# Patient Record
Sex: FEMALE | Race: BLACK OR AFRICAN AMERICAN
[De-identification: names, ages, dates, MRNs, and addresses within clinical notes are randomized per-mention and may not be internally consistent; named-entity substitution may affect disease eponyms.]

---

## 2017-01-04 ENCOUNTER — APPOINTMENT (OUTPATIENT)
Dept: OBGYN | Facility: CLINIC | Age: 27
End: 2017-01-04

## 2017-01-04 VITALS — WEIGHT: 157 LBS | SYSTOLIC BLOOD PRESSURE: 110 MMHG | DIASTOLIC BLOOD PRESSURE: 70 MMHG

## 2017-01-04 LAB
BILIRUB UR QL STRIP: NORMAL
CLARITY UR: CLEAR
COLLECTION METHOD: NORMAL
GLUCOSE UR-MCNC: NORMAL
HCG UR QL: 0.2 EU/DL
HGB UR QL STRIP.AUTO: NORMAL
KETONES UR-MCNC: NORMAL
LEUKOCYTE ESTERASE UR QL STRIP: NORMAL
NITRITE UR QL STRIP: NORMAL
PH UR STRIP: 7
PROT UR STRIP-MCNC: NORMAL
SP GR UR STRIP: 1.01

## 2017-01-10 ENCOUNTER — APPOINTMENT (OUTPATIENT)
Dept: OBGYN | Facility: CLINIC | Age: 27
End: 2017-01-10

## 2017-01-10 VITALS — DIASTOLIC BLOOD PRESSURE: 80 MMHG | WEIGHT: 160 LBS | SYSTOLIC BLOOD PRESSURE: 104 MMHG

## 2017-01-10 DIAGNOSIS — Z3A.38 38 WEEKS GESTATION OF PREGNANCY: ICD-10-CM

## 2017-01-14 ENCOUNTER — INPATIENT (INPATIENT)
Facility: HOSPITAL | Age: 27
LOS: 1 days | Discharge: HOME | End: 2017-01-16
Attending: SPECIALIST | Admitting: SPECIALIST
Payer: COMMERCIAL

## 2017-01-14 PROCEDURE — 59400 OBSTETRICAL CARE: CPT

## 2017-01-15 PROCEDURE — 93010 ELECTROCARDIOGRAM REPORT: CPT

## 2017-01-16 PROCEDURE — 93306 TTE W/DOPPLER COMPLETE: CPT | Mod: 26

## 2017-01-17 ENCOUNTER — APPOINTMENT (OUTPATIENT)
Dept: OBGYN | Facility: CLINIC | Age: 27
End: 2017-01-17

## 2017-01-20 DIAGNOSIS — D72.829 ELEVATED WHITE BLOOD CELL COUNT, UNSPECIFIED: ICD-10-CM

## 2017-01-20 DIAGNOSIS — Z3A.39 39 WEEKS GESTATION OF PREGNANCY: ICD-10-CM

## 2017-01-20 DIAGNOSIS — R07.9 CHEST PAIN, UNSPECIFIED: ICD-10-CM

## 2017-01-20 DIAGNOSIS — R03.0 ELEVATED BLOOD-PRESSURE READING, WITHOUT DIAGNOSIS OF HYPERTENSION: ICD-10-CM

## 2017-01-23 ENCOUNTER — APPOINTMENT (OUTPATIENT)
Dept: OBGYN | Facility: CLINIC | Age: 27
End: 2017-01-23

## 2017-01-23 VITALS
SYSTOLIC BLOOD PRESSURE: 122 MMHG | WEIGHT: 144 LBS | DIASTOLIC BLOOD PRESSURE: 82 MMHG | HEIGHT: 69 IN | BODY MASS INDEX: 21.33 KG/M2

## 2017-01-30 DIAGNOSIS — O42.90 PREMATURE RUPTURE OF MEMBRANES, UNSPECIFIED AS TO LENGTH OF TIME BETWEEN RUPTURE AND ONSET OF LABOR, UNSPECIFIED WEEKS OF GESTATION: ICD-10-CM

## 2017-02-14 ENCOUNTER — APPOINTMENT (OUTPATIENT)
Dept: OBGYN | Facility: CLINIC | Age: 27
End: 2017-02-14

## 2017-02-14 VITALS — WEIGHT: 144 LBS | SYSTOLIC BLOOD PRESSURE: 108 MMHG | DIASTOLIC BLOOD PRESSURE: 64 MMHG

## 2017-02-14 DIAGNOSIS — Z30.9 ENCOUNTER FOR CONTRACEPTIVE MANAGEMENT, UNSPECIFIED: ICD-10-CM

## 2017-02-14 DIAGNOSIS — Z30.013 ENCOUNTER FOR INITIAL PRESCRIPTION OF INJECTABLE CONTRACEPTIVE: ICD-10-CM

## 2017-02-15 LAB
BILIRUB UR QL STRIP: NORMAL
CLARITY UR: CLEAR
COLLECTION METHOD: NORMAL
GLUCOSE UR-MCNC: NORMAL
HCG UR QL: 0.2 EU/DL
HGB UR QL STRIP.AUTO: NORMAL
KETONES UR-MCNC: NORMAL
LEUKOCYTE ESTERASE UR QL STRIP: NORMAL
NITRITE UR QL STRIP: NORMAL
PH UR STRIP: 6
PROT UR STRIP-MCNC: NORMAL
SP GR UR STRIP: 1.02

## 2017-03-01 LAB
C TRACH DNA SPEC QL NAA+PROBE: NORMAL
C TRACH RRNA SPEC QL NAA+PROBE: NORMAL
CYTOLOGY CVX/VAG DOC THIN PREP: NORMAL
N GONORRHOEA DNA SPEC QL NAA+PROBE: NORMAL
N GONORRHOEA RRNA SPEC QL NAA+PROBE: NORMAL
SOURCE TP AMPLIFICATION: NORMAL

## 2017-03-08 ENCOUNTER — MESSAGE (OUTPATIENT)
Age: 27
End: 2017-03-08

## 2017-04-13 RX ORDER — MEDROXYPROGESTERONE ACETATE 150 MG/ML
150 INJECTION, SUSPENSION INTRAMUSCULAR
Qty: 1 | Refills: 3 | Status: COMPLETED | COMMUNITY
Start: 2017-02-14 | End: 2017-02-14

## 2020-05-14 ENCOUNTER — OFFICE VISIT (OUTPATIENT)
Dept: ORTHOPEDICS | Facility: CLINIC | Age: 30
End: 2020-05-14
Payer: COMMERCIAL

## 2020-05-14 ENCOUNTER — ANCILLARY PROCEDURE (OUTPATIENT)
Dept: GENERAL RADIOLOGY | Facility: CLINIC | Age: 30
End: 2020-05-14
Attending: FAMILY MEDICINE
Payer: COMMERCIAL

## 2020-05-14 VITALS — HEIGHT: 69 IN | WEIGHT: 160 LBS | BODY MASS INDEX: 23.7 KG/M2

## 2020-05-14 DIAGNOSIS — M25.552 LEFT HIP PAIN: Primary | ICD-10-CM

## 2020-05-14 DIAGNOSIS — M25.552 PAIN OF LEFT HIP JOINT: ICD-10-CM

## 2020-05-14 ASSESSMENT — MIFFLIN-ST. JEOR: SCORE: 1525.14

## 2020-05-14 NOTE — PROGRESS NOTES
SPORTS & ORTHOPEDIC WALK-IN VISIT 5/14/2020    Primary Care Physician: None    Reason for visit:     What part of your body is injured / painful?  left hip.     What caused the injury /pain? Unsure    How long ago did your injury occur or pain begin? several months ago    What are your most bothersome symptoms? Pain    How would you characterize your symptom?  aching and throbing    What makes your symptoms better? Rest and Ice    What makes your symptoms worse? Playing sport, lifting leg    Have you been previously seen for this problem? 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  It started at the end of January she was playing basketball and started to notice left hip pain after a game. The pain increases with activity. The majority of her pain is on the anterior hip and hip extension increase pain. OVerall her hip is feeling better but not 100% and she is concerned she would not be able to complete the upcoming season.  Training some but limited amounts of basketball due to COVID 19.  [place] [employer] [place] [employer] in [place].  Here in [place] staying with her boyfriend.     No catching or locking.  No previous hip pain and no L sided hip pain.      One prior BSI, 5th MT several years ago healed well.       Medical History:    Any recent changes to your medical history? No    Any new medication prescribed since last visit? No    Have you had surgery on this body part before? No    Social History:    Occupation: Basketball    Handedness: Right    Exercise: Basketball    Review of Systems:    Do you have fever, chills, weight loss? No    Do you have any vision problems? No    Do you have any chest pain or edema? No    Do you have any shortness of breath or wheezing?  No    Do you have stomach problems? No    Do you have any numbness or focal weakness? No    Do you have diabetes? No    Do you have  "problems with bleeding or clotting? No    Do you have an rashes or other skin lesions? No       O:Ht 1.753 m (5' 9\")   Wt 72.6 kg (160 lb)   BMI 23.63 kg/m    Alert, NAD  NC/AT  Sclerae anicteric  Regular  Resp nonlabored  Skin warm and dry  No focal neuro deficits. Speech intact. Normal gait.  Appropriate affect    TTP over AIIS. NTTP ASIS, anterior hip joint, GT, ITB, SI.   Pain with flexion >100 deg and IR >0.  Pain with resisted hip flexion with knee bent.    +FADIR. -ARACELY, -thigh thrust. -log roll.   No pain with single or double leg hop test x 5.       IMAGING:   Xr Pelvis W Hip Lt G/e 2 Vw    Result Date: 5/14/2020  2 views pelvis radiograph(s) 5/14/2020 5:51 PM History: AP pelvis, modified law lateral; Left hip pain Comparison: None available Findings: AP pelvis and frog-leg lateral view of the left hip were obtained. No acute osseous abnormality. No substantial degenerative change of hips. Sacrum and innominate bones are partially obscured by overlying bowel gas/fecal content. Mildly reduced femoral head neck offset bilaterally.     Impression: 1. No acute osseous abnormality. 2. No substantial degenerative change. 3. Mildly reduced femoral head neck offset of the proximal femur. JUTTA ELLERMANN, MD       A/P:   1. Chronic Left hip pain, suspect LUCY and possible labral tear   Xrays reviewed with patient and with evidence of LUCY, consistent with exam. Stress fracture is less likely but important to rule out, and discussed with patient an MRI with arthrogram would be best next step to evaluate. Patient is in agreement and we will get this set up. Follow up after MRI.   - XR Pelvis w Hip LT G/E 2 vw  - MR Hip Left w Contrast; Future     Seen and discussed with Dr. Lee.     Peng Milian MD  Primary Care Sports Medicine Fellow   "

## 2020-05-14 NOTE — LETTER
Date:May 27, 2020      Patient was self referred, no letter generated. Do not send.        [facility] Physicians Health Information

## 2020-05-15 ENCOUNTER — TELEPHONE (OUTPATIENT)
Dept: ORTHOPEDICS | Facility: CLINIC | Age: 30
End: 2020-05-15

## 2020-05-15 NOTE — TELEPHONE ENCOUNTER
Patient called in requesting MRI without contrast since radiology told her that they were not doing MRIs with injections at this time, and it is organization wide. I was unable to get a hold of Dr. Lee. I presented the option to the patient of going over to [facility] to have this done, so we can try to keep to what Dr. Lee wanted. The patient was fine with doing that. I will call over to [facility] and make sure that they are able to do this and get back to the patient.

## 2020-05-15 NOTE — TELEPHONE ENCOUNTER
I verified with [facility] that they are able to do MRIs with injections (including lidocaine) as long it is specified in the order. I will fax over the orders to [facility] in [place] since its probably the closest to the patient. [facility] states that they can talk insurance with patient when she tries to schedule. I relayed the message to patient that she can get this done at [facility] in [place]. The phone number was provided for that location. The patient was instructed to call our clinic back to inform us when this is completed, so we can let Dr. Lee know and we can obtain those images. The patient verbalized understanding.

## 2020-05-25 NOTE — PROGRESS NOTES
Attending Note:   I have personally examined this patient and have reviewed the clinical presentation and progress note with the fellow. I agree with the treatment plan as outlined. The plan was formulated with the fellow on the day of the patient's visit. I have reviewed all imaging with the fellow and agree with the findings in the documentation.  Kaela agrees to have me discuss her appt and imaging with Dr. 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     Ale Lee MD, CAQ, CCD  [facility]  Sports Medicine and Bone Health

## 2020-05-27 RX ORDER — DICLOFENAC SODIUM 75 MG/1
75 TABLET, DELAYED RELEASE ORAL 2 TIMES DAILY
Qty: 60 TABLET | Refills: 0 | Status: SHIPPED | OUTPATIENT
Start: 2020-05-27 | End: 2024-08-23

## 2020-06-01 ENCOUNTER — TRANSFERRED RECORDS (OUTPATIENT)
Dept: HEALTH INFORMATION MANAGEMENT | Facility: CLINIC | Age: 30
End: 2020-06-01

## 2020-06-10 ENCOUNTER — THERAPY VISIT (OUTPATIENT)
Dept: PHYSICAL THERAPY | Facility: CLINIC | Age: 30
End: 2020-06-10
Payer: COMMERCIAL

## 2020-06-10 DIAGNOSIS — M25.552 PAIN OF LEFT HIP JOINT: ICD-10-CM

## 2020-06-10 PROCEDURE — 97112 NEUROMUSCULAR REEDUCATION: CPT | Mod: GP | Performed by: PHYSICAL THERAPIST

## 2020-06-10 PROCEDURE — 97140 MANUAL THERAPY 1/> REGIONS: CPT | Mod: GP | Performed by: PHYSICAL THERAPIST

## 2020-06-10 PROCEDURE — 97161 PT EVAL LOW COMPLEX 20 MIN: CPT | Mod: GP | Performed by: PHYSICAL THERAPIST

## 2020-06-10 NOTE — PROGRESS NOTES
[facility] Initial Evaluation  Subjective:  HPI                  [facility] - [facility]  Physical Therapy Initial Examination/Evaluation  Tamar 10, 2020    Kaela Stevens is a 29 year old  female referred to physical therapy by Dr. Lee for treatment of hip pain with Precautions/Restrictions/MD instructions none    KEY PT FINDINGS:  1.  Functional valgus noted with SL squat  2.  Restricted hip IR and ER of L hip  3.  Excellent core stability    Subjective:  Referring MD visit date: 5/15/20  DOI/onset: January 2020  Mechanism of injury: Felt onset of pain after playing in an Olympic qualifying match (3 games in 4 days). Made the [place] national team, but Olympics postponed secondary to pandemic.     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  Achiness after playing [employer].   Pain: best 0 /10, worst 6/10 anterior  Described as: achy Alleviated by: rest Frequency: intermittent Progression of symptoms since initial onset: improving Time of day when pain is worse: not related  Sleeping: not affected  Social history:  Originally from [place].  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  Now has signed with the [employer] [employer] [employer] [facility] [employer] team, hopes to start end of July.  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    Patient's goals are reduce pain    Pertinent PMH: none; son is aged 3, vaginal delivery   General Health Reported by Patient: excellent  Return to MD:  PRN; saw Dr. Dsouza for surgical consult.  Offered surgery, patient choosing to defer for now.        Lower Extremity Exam    Dynamic Movement Screen:  2 leg stance: normal  2 leg squat:Normal  Spine rotation in stance: assess next    1 leg stance:   Right: proprioceptive challenge  Left: proprioceptive  challenge    1 leg squat:   Right: proprioceptive challenge, excessive contralateral pelvic drop and excessive femoral IR/ADD  Left: proprioceptive challenge, excessive contralateral pelvic drop and excessive femoral IR/ADD    Gait: WNL      Palpation:   Tender to palpation at the following structures: adductors, hip flexors    Hip Joint PROM Screen   Right Left Difference   Flex 100 deg 100 deg 0 deg   ER 70 deg 40 deg 30 deg   IR 30 deg 20 deg 10 deg     Lower Extremity Muscle Strength (x/5)   Right Left   Hip ER 5-/5 5-/5   Hip IR 5-/5 5-/5   Hip ABD 5/5 5-/5   Hip Ext 5-/5 4+/5   Knee Flex 5/5 5/5     Lower Extremity Flexibility Screen:   Right Left   Hamstring - -   FADIR - +   Quadriceps - -   Gastroc/ Soleus - -   - = normal  + = mild tightness  ++ = moderate tightness  +++ = significant tightness    Foot Screen:   neutral calcaneal orientation                   Objective:  System    Physical Exam    General     ROS    Assessment/Plan:    Patient is a 29 year old female with left side hip complaints.    Patient has the following significant findings with corresponding treatment plan.                Diagnosis 1:  Hip pain    Pain -  hot/cold therapy, US, electric stimulation, manual therapy, splint/taping/bracing/orthotics, self management, education and home program  Decreased ROM/flexibility - manual therapy and therapeutic exercise  Decreased joint mobility - manual therapy and therapeutic exercise  Decreased strength - therapeutic exercise and therapeutic activities  Impaired balance - neuro re-education and therapeutic activities  Decreased proprioception - neuro re-education and therapeutic activities  Impaired muscle performance - neuro re-education  Decreased function - therapeutic activities    Therapy Evaluation Codes:   1) History comprised of:   Personal factors that impact the plan of care:      None.    Comorbidity factors that impact the plan of care are:      None.     Medications impacting  care: None.  2) Examination of Body Systems comprised of:   Body structures and functions that impact the plan of care:      Hip.   Activity limitations that impact the plan of care are:      Running and Sports.  3) Clinical presentation characteristics are:   Stable/Uncomplicated.  4) Decision-Making    Low complexity using standardized patient assessment instrument and/or measureable assessment of functional outcome.  Cumulative Therapy Evaluation is: Low complexity.    Previous and current functional limitations:  (See Goal Flow Sheet for this information)    Short term and Long term goals: (See Goal Flow Sheet for this information)     Communication ability:  Patient appears to be able to clearly communicate and understand verbal and written communication and follow directions correctly.  Treatment Explanation - The following has been discussed with the patient:   RX ordered/plan of care  Anticipated outcomes  Possible risks and side effects  This patient would benefit from PT intervention to resume normal activities.   Rehab potential is good.    Frequency:  1 X week, once daily  Duration:  for 8 weeks  Discharge Plan:  Achieve all LTG.  Independent in home treatment program.  Reach maximal therapeutic benefit.    Please refer to the daily flowsheet for treatment today, total treatment time and time spent performing 1:1 timed codes.

## 2020-06-17 ENCOUNTER — THERAPY VISIT (OUTPATIENT)
Dept: PHYSICAL THERAPY | Facility: CLINIC | Age: 30
End: 2020-06-17
Payer: COMMERCIAL

## 2020-06-17 DIAGNOSIS — M25.552 PAIN OF LEFT HIP JOINT: Primary | ICD-10-CM

## 2020-06-17 PROCEDURE — 97112 NEUROMUSCULAR REEDUCATION: CPT | Mod: GP | Performed by: PHYSICAL THERAPIST

## 2020-06-17 PROCEDURE — 97140 MANUAL THERAPY 1/> REGIONS: CPT | Mod: GP | Performed by: PHYSICAL THERAPIST

## 2020-06-17 PROCEDURE — 97110 THERAPEUTIC EXERCISES: CPT | Mod: GP | Performed by: PHYSICAL THERAPIST

## 2020-06-22 ENCOUNTER — TELEPHONE (OUTPATIENT)
Dept: ORTHOPEDICS | Facility: CLINIC | Age: 30
End: 2020-06-22

## 2020-06-22 DIAGNOSIS — Z02.89 PHYSICAL EXAMINATION OF EMPLOYEE: Primary | ICD-10-CM

## 2020-06-22 NOTE — TELEPHONE ENCOUNTER
Let patient know that her physical is scheduled for 1 pm on Thursday, 6/25 with Dr. Lee. She was also provided the imaging number to schedule an echocardiogram prior to her appointment on Thursday. She will text Dr. Lee if unable to complete by then. Patient is aware of current restrictions at [facility].

## 2020-06-24 ENCOUNTER — THERAPY VISIT (OUTPATIENT)
Dept: PHYSICAL THERAPY | Facility: CLINIC | Age: 30
End: 2020-06-24
Payer: COMMERCIAL

## 2020-06-24 DIAGNOSIS — M25.552 PAIN OF LEFT HIP JOINT: Primary | ICD-10-CM

## 2020-06-24 PROCEDURE — 97140 MANUAL THERAPY 1/> REGIONS: CPT | Mod: GP | Performed by: PHYSICAL THERAPIST

## 2020-06-24 PROCEDURE — 97112 NEUROMUSCULAR REEDUCATION: CPT | Mod: GP | Performed by: PHYSICAL THERAPIST

## 2020-06-25 ENCOUNTER — TRANSFERRED RECORDS (OUTPATIENT)
Dept: HEALTH INFORMATION MANAGEMENT | Facility: CLINIC | Age: 30
End: 2020-06-25

## 2020-06-25 ENCOUNTER — OFFICE VISIT (OUTPATIENT)
Dept: ORTHOPEDICS | Facility: CLINIC | Age: 30
End: 2020-06-25
Payer: COMMERCIAL

## 2020-06-25 ENCOUNTER — ANCILLARY PROCEDURE (OUTPATIENT)
Dept: CARDIOLOGY | Facility: CLINIC | Age: 30
End: 2020-06-25
Attending: FAMILY MEDICINE
Payer: COMMERCIAL

## 2020-06-25 VITALS
TEMPERATURE: 98.4 F | HEART RATE: 49 BPM | SYSTOLIC BLOOD PRESSURE: 114 MMHG | DIASTOLIC BLOOD PRESSURE: 70 MMHG | BODY MASS INDEX: 23.99 KG/M2 | WEIGHT: 162 LBS | OXYGEN SATURATION: 100 % | HEIGHT: 69 IN

## 2020-06-25 DIAGNOSIS — Z13.1 SCREENING FOR DIABETES MELLITUS: ICD-10-CM

## 2020-06-25 DIAGNOSIS — Z00.00 ENCOUNTER FOR PHYSICAL EXAMINATION: Primary | ICD-10-CM

## 2020-06-25 DIAGNOSIS — Z87.42 HX OF AMENORRHEA: ICD-10-CM

## 2020-06-25 DIAGNOSIS — M25.852 FEMOROACETABULAR IMPINGEMENT OF LEFT HIP: ICD-10-CM

## 2020-06-25 DIAGNOSIS — N91.5 OLIGOMENORRHEA, UNSPECIFIED TYPE: ICD-10-CM

## 2020-06-25 DIAGNOSIS — Z13.220 LIPID SCREENING: ICD-10-CM

## 2020-06-25 DIAGNOSIS — Z02.89 PHYSICAL EXAMINATION OF EMPLOYEE: ICD-10-CM

## 2020-06-25 ASSESSMENT — MIFFLIN-ST. JEOR: SCORE: 1538.82

## 2020-06-25 NOTE — LETTER
Date:June 29, 2020      Patient was self referred, no letter generated. Do not send.        [facility] Physicians Health Information

## 2020-06-25 NOTE — LETTER
6/25/2020      RE: Kaela Stevens  [address] [address]       Completed [employer] PPE. See scanned form for complete documentation.     Seen and discussed with Dr. Lee.     Peng Milian MD  Primary Care Sports Medicine Fellow     Attending Note:   I have personally examined this patient and have reviewed the clinical presentation and progress note with the fellow. I agree with the treatment plan as outlined. The plan was formulated with the fellow on the day of the patient's visit. I have reviewed the EKGs and the Echo report with the fellow and agree with the findings in the documentation.     Ale Lee MD, CAQ, CCD  [facility]  Sports Medicine and Bone Health    Ale Lee MD

## 2020-06-25 NOTE — PROGRESS NOTES
Completed [employer] PPE. See scanned form for complete documentation.     Seen and discussed with Dr. Lee.     Peng Milian MD  Primary Care Sports Medicine Fellow

## 2020-06-26 DIAGNOSIS — N91.5 OLIGOMENORRHEA, UNSPECIFIED TYPE: ICD-10-CM

## 2020-06-26 DIAGNOSIS — Z13.1 SCREENING FOR DIABETES MELLITUS: ICD-10-CM

## 2020-06-26 DIAGNOSIS — Z13.220 LIPID SCREENING: ICD-10-CM

## 2020-06-26 DIAGNOSIS — Z00.00 ENCOUNTER FOR PHYSICAL EXAMINATION: ICD-10-CM

## 2020-06-26 LAB
ANION GAP SERPL CALCULATED.3IONS-SCNC: 4 MMOL/L (ref 3–14)
BUN SERPL-MCNC: 20 MG/DL (ref 7–30)
CALCIUM SERPL-MCNC: 8.7 MG/DL (ref 8.5–10.1)
CHLORIDE SERPL-SCNC: 108 MMOL/L (ref 94–109)
CHOLEST SERPL-MCNC: 205 MG/DL
CO2 SERPL-SCNC: 27 MMOL/L (ref 20–32)
CREAT SERPL-MCNC: 0.91 MG/DL (ref 0.52–1.04)
DEPRECATED CALCIDIOL+CALCIFEROL SERPL-MC: 33 UG/L (ref 20–75)
ESTRADIOL SERPL-MCNC: 45 PG/ML
FSH SERPL-ACNC: 6.1 IU/L
GFR SERPL CREATININE-BSD FRML MDRD: 86 ML/MIN/{1.73_M2}
GLUCOSE SERPL-MCNC: 85 MG/DL (ref 70–99)
HCG UR QL: NEGATIVE
HDLC SERPL-MCNC: 97 MG/DL
LDLC SERPL CALC-MCNC: 84 MG/DL
LH SERPL-ACNC: 19.5 IU/L
NONHDLC SERPL-MCNC: 108 MG/DL
POTASSIUM SERPL-SCNC: 3.9 MMOL/L (ref 3.4–5.3)
PROLACTIN SERPL-MCNC: 20 UG/L (ref 3–27)
SODIUM SERPL-SCNC: 138 MMOL/L (ref 133–144)
TRIGL SERPL-MCNC: 121 MG/DL
TSH SERPL DL<=0.005 MIU/L-ACNC: 2.01 MU/L (ref 0.4–4)

## 2020-06-29 NOTE — PROGRESS NOTES
Attending Note:   I have personally examined this patient and have reviewed the clinical presentation and progress note with the fellow. I agree with the treatment plan as outlined. The plan was formulated with the fellow on the day of the patient's visit. I have reviewed the EKGs and the Echo report with the fellow and agree with the findings in the documentation.     Ale Lee MD, CAQ, CCD  [facility]  Sports Medicine and Bone Health

## 2020-06-30 ENCOUNTER — THERAPY VISIT (OUTPATIENT)
Dept: PHYSICAL THERAPY | Facility: CLINIC | Age: 30
End: 2020-06-30
Payer: COMMERCIAL

## 2020-06-30 DIAGNOSIS — M25.552 PAIN OF LEFT HIP JOINT: Primary | ICD-10-CM

## 2020-06-30 PROCEDURE — 97112 NEUROMUSCULAR REEDUCATION: CPT | Mod: GP | Performed by: PHYSICAL THERAPIST

## 2020-06-30 PROCEDURE — 97140 MANUAL THERAPY 1/> REGIONS: CPT | Mod: GP | Performed by: PHYSICAL THERAPIST

## 2020-06-30 PROCEDURE — 97530 THERAPEUTIC ACTIVITIES: CPT | Mod: GP | Performed by: PHYSICAL THERAPIST

## 2020-09-04 ENCOUNTER — TRANSFERRED RECORDS (OUTPATIENT)
Dept: HEALTH INFORMATION MANAGEMENT | Facility: CLINIC | Age: 30
End: 2020-09-04

## 2020-09-16 ENCOUNTER — TRANSFERRED RECORDS (OUTPATIENT)
Dept: HEALTH INFORMATION MANAGEMENT | Facility: CLINIC | Age: 30
End: 2020-09-16

## 2020-09-17 ENCOUNTER — OFFICE VISIT (OUTPATIENT)
Dept: ORTHOPEDICS | Facility: CLINIC | Age: 30
End: 2020-09-17
Payer: COMMERCIAL

## 2020-09-17 ENCOUNTER — TRANSFERRED RECORDS (OUTPATIENT)
Dept: HEALTH INFORMATION MANAGEMENT | Facility: CLINIC | Age: 30
End: 2020-09-17

## 2020-09-17 VITALS
DIASTOLIC BLOOD PRESSURE: 81 MMHG | WEIGHT: 161 LBS | HEART RATE: 61 BPM | HEIGHT: 71 IN | BODY MASS INDEX: 22.54 KG/M2 | SYSTOLIC BLOOD PRESSURE: 116 MMHG

## 2020-09-17 DIAGNOSIS — Z01.818 PREOP GENERAL PHYSICAL EXAM: Primary | ICD-10-CM

## 2020-09-17 ASSESSMENT — MIFFLIN-ST. JEOR: SCORE: 1557.45

## 2020-09-17 NOTE — LETTER
9/17/2020      RE: Kaela Stveens  [address] [address]         [facility] SPORTS MEDICINE  [address]  4TH FLOOR  [place] [facility] [address]  Phone: [phone]  Fax: [phone]  Primary Provider: No Ref-Primary, Physician  Pre-op Performing Provider: JUANITA MAKI    PREOPERATIVE EVALUATION:  Today's date: 9/17/2020    Kaela Stevens is a 29 year old female who presents for a preoperative evaluation.    Surgical Information:  Right ACL repair.  Fax number for surgical facility: Note does not need to be faxed, will be available electronically in [facility] & specific surgical preop evaluation was provided to the patient to bring in for day of surgery.  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  She was seen at [facility] for further management.  MRI showed ACL tear.  Plan for Dr. Alton Dsouza to perform ACL repair.    Patient does not have a Health Care Directive or Living Will: Discussed advance care planning with patient; however, patient declined at this time.    RX [place] ([place] reviewed:  reviewed- no concerns    No Chronic issues    Review of Systems  Constitutional, neuro, ENT, endocrine, pulmonary, cardiac, gastrointestinal, genitourinary, musculoskeletal, integument and psychiatric systems are negative, except as otherwise noted.    There are no active problems to display for this patient.     Past Medical History:   Diagnosis Date     Left hand fracture      Metatarsal stress fracture of right foot      Oligomenorrhea      No past surgical history on file.  Current Outpatient Medications   Medication Sig Dispense Refill     diclofenac (VOLTAREN) 75 MG EC tablet Take 1 tablet (75 mg) by mouth 2 times daily (Patient not taking: Reported on 9/17/2020)  "60 tablet 0       Allergies   Allergen Reactions     Fluconazole Rash        Social History     Tobacco Use     Smoking status: Never Smoker     Smokeless tobacco: Never Used   Substance Use Topics     Alcohol use: Not on file     Family History   Problem Relation Age of Onset     Diabetes Type 2  Father      History   Drug Use Not on file            Objective   /81   Pulse 61   Ht 1.797 m (5' 10.75\")   Wt 73 kg (161 lb)   BMI 22.61 kg/m    Physical Exam  GENERAL APPEARANCE: healthy, alert and no distress  HENT: ear canals and TM's normal and nose and mouth without ulcers or lesions  RESP: lungs clear to auscultation - no rales, rhonchi or wheezes  CV: regular rate and rhythm, normal S1 S2, no S3 or S4 and no murmur, click or rub   ABDOMEN: soft, nontender, no HSM or masses and bowel sounds normal  MS: Mild edema and joint effusion on the right. Positive lachmans. Negative varus, valgus and posterior drawer  NEURO: Normal strength and tone, sensory exam grossly normal, mentation intact and speech normal    Recent Labs   Lab Test 06/26/20  0816      POTASSIUM 3.9   CR 0.91        PRE-OP Diagnostics:  No labs were ordered during this visit.  No EKG required for low risk surgery (cataract, skin procedure, breast biopsy, etc).  6}     Assessment & Plan   The proposed surgical procedure is considered INTERMEDIATE risk.    REVISED CARDIAC RISK INDEX  The patient has the following serious cardiovascular risks for perioperative complications:  No serious cardiac risks = 0 points    INTERPRETATION: 0 points: Class I (very low risk - 0.4% complication rate)         ICD-10-CM    1. Preop general physical exam  Z01.818        The patient has the following additional risks and recommendations for perioperative complications:     - No identified additional risk factors other than previously addressed     MEDICATION INSTRUCTIONS:  Patient is on no chronic medications    RECOMMENDATION:  APPROVAL GIVEN to proceed " with proposed procedure, without further diagnostic evaluation.    No follow-ups on file.    Signed Electronically by: Courtney Bond DO    Copy of this evaluation report is provided to requesting physician.    [facility] Guidelines    Revised Cardiac Risk Index    Attending Note:   I have  examined this patient and have reviewed the clinical presentation and progress note with the fellow. I agree with the treatment plan as outlined. The plan was formulated with the fellow on the day of the patient's visit.   Ale Lee MD, CAQ, CCD  [facility]  Sports Medicine and Bone Health    Courtney Bond DO

## 2020-09-17 NOTE — PROGRESS NOTES
[facility] SPORTS MEDICINE  [address] [address]  Phone: [phone]  Fax: [phone]  Primary Provider: No Ref-Primary, Physician  Pre-op Performing Provider: JUANITA MAKI    PREOPERATIVE EVALUATION:  Today's date: 9/17/2020    Kaela Stevens is a 29 year old female who presents for a preoperative evaluation.    Surgical Information:  Right ACL repair.  Fax number for surgical facility: Note does not need to be faxed, will be available electronically in [facility] & specific surgical preop evaluation was provided to the patient to bring in for day of surgery.  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  She was seen at [facility] for further management.  MRI showed ACL tear.  Plan for Dr. Alton Dsouza to perform ACL repair.    Patient does not have a Health Care Directive or Living Will: Discussed advance care planning with patient; however, patient declined at this time.    RX [place] ([place] reviewed:  reviewed- no concerns    No Chronic issues    Review of Systems  Constitutional, neuro, ENT, endocrine, pulmonary, cardiac, gastrointestinal, genitourinary, musculoskeletal, integument and psychiatric systems are negative, except as otherwise noted.    There are no active problems to display for this patient.     Past Medical History:   Diagnosis Date     Left hand fracture      Metatarsal stress fracture of right foot      Oligomenorrhea      No past surgical history on file.  Current Outpatient Medications   Medication Sig Dispense Refill     diclofenac (VOLTAREN) 75 MG EC tablet Take 1 tablet (75 mg) by mouth 2 times daily (Patient not taking: Reported on 9/17/2020) 60 tablet 0       Allergies   Allergen Reactions     Fluconazole Rash        Social  "History     Tobacco Use     Smoking status: Never Smoker     Smokeless tobacco: Never Used   Substance Use Topics     Alcohol use: Not on file     Family History   Problem Relation Age of Onset     Diabetes Type 2  Father      History   Drug Use Not on file            Objective   /81   Pulse 61   Ht 1.797 m (5' 10.75\")   Wt 73 kg (161 lb)   BMI 22.61 kg/m    Physical Exam  GENERAL APPEARANCE: healthy, alert and no distress  HENT: ear canals and TM's normal and nose and mouth without ulcers or lesions  RESP: lungs clear to auscultation - no rales, rhonchi or wheezes  CV: regular rate and rhythm, normal S1 S2, no S3 or S4 and no murmur, click or rub   ABDOMEN: soft, nontender, no HSM or masses and bowel sounds normal  MS: Mild edema and joint effusion on the right. Positive lachmans. Negative varus, valgus and posterior drawer  NEURO: Normal strength and tone, sensory exam grossly normal, mentation intact and speech normal    Recent Labs   Lab Test 06/26/20  0816      POTASSIUM 3.9   CR 0.91        PRE-OP Diagnostics:  No labs were ordered during this visit.  No EKG required for low risk surgery (cataract, skin procedure, breast biopsy, etc).  6}     Assessment & Plan   The proposed surgical procedure is considered INTERMEDIATE risk.    REVISED CARDIAC RISK INDEX  The patient has the following serious cardiovascular risks for perioperative complications:  No serious cardiac risks = 0 points    INTERPRETATION: 0 points: Class I (very low risk - 0.4% complication rate)         ICD-10-CM    1. Preop general physical exam  Z01.818        The patient has the following additional risks and recommendations for perioperative complications:     - No identified additional risk factors other than previously addressed     MEDICATION INSTRUCTIONS:  Patient is on no chronic medications    RECOMMENDATION:  APPROVAL GIVEN to proceed with proposed procedure, without further diagnostic evaluation.    No follow-ups on " file.    Signed Electronically by: Courtney Bond,     Copy of this evaluation report is provided to requesting physician.    Preop [facility] Preop Guidelines    Revised Cardiac Risk Index

## 2020-09-17 NOTE — LETTER
Date:September 29, 2020      Patient was self referred, no letter generated. Do not send.        [facility] Physicians Health Information

## 2020-09-17 NOTE — PATIENT INSTRUCTIONS

## 2020-09-17 NOTE — PROGRESS NOTES
Attending Note:   I have  examined this patient and have reviewed the clinical presentation and progress note with the fellow. I agree with the treatment plan as outlined. The plan was formulated with the fellow on the day of the patient's visit.   Ale Lee MD, CAQ, CCD  [facility]  Sports Medicine and Bone Health

## 2020-09-30 ENCOUNTER — TRANSFERRED RECORDS (OUTPATIENT)
Dept: HEALTH INFORMATION MANAGEMENT | Facility: CLINIC | Age: 30
End: 2020-09-30

## 2020-11-04 ENCOUNTER — TRANSFERRED RECORDS (OUTPATIENT)
Dept: HEALTH INFORMATION MANAGEMENT | Facility: CLINIC | Age: 30
End: 2020-11-04

## 2020-11-23 ENCOUNTER — TELEPHONE (OUTPATIENT)
Dept: ORTHOPEDICS | Facility: CLINIC | Age: 30
End: 2020-11-23

## 2020-11-23 NOTE — TELEPHONE ENCOUNTER
Ortho  rec'd online appt request from patient to see Dr Lee for a preoperative physical for 11/23 or 11/24.  Pt has had preoperative physical with Dr Bond in the past.  Please contact patient to get this scheduled if appropriate.

## 2020-11-23 NOTE — TELEPHONE ENCOUNTER
I let the patient know that Dr. Lee will see her at 1:15 pm tomorrow. She was informed of the no visitor policy.

## 2020-11-24 ENCOUNTER — TRANSFERRED RECORDS (OUTPATIENT)
Dept: HEALTH INFORMATION MANAGEMENT | Facility: CLINIC | Age: 30
End: 2020-11-24

## 2020-11-24 ENCOUNTER — OFFICE VISIT (OUTPATIENT)
Dept: ORTHOPEDICS | Facility: CLINIC | Age: 30
End: 2020-11-24
Payer: COMMERCIAL

## 2020-11-24 VITALS
HEART RATE: 51 BPM | WEIGHT: 160.4 LBS | DIASTOLIC BLOOD PRESSURE: 73 MMHG | SYSTOLIC BLOOD PRESSURE: 114 MMHG | OXYGEN SATURATION: 99 % | HEIGHT: 71 IN | BODY MASS INDEX: 22.46 KG/M2

## 2020-11-24 DIAGNOSIS — Z01.818 PRE-OP EXAMINATION: Primary | ICD-10-CM

## 2020-11-24 PROCEDURE — 99211 OFF/OP EST MAY X REQ PHY/QHP: CPT | Performed by: FAMILY MEDICINE

## 2020-11-24 RX ORDER — ETONOGESTREL AND ETHINYL ESTRADIOL .12; .015 MG/D; MG/D
RING VAGINAL
COMMUNITY
Start: 2020-11-12 | End: 2024-08-23

## 2020-11-24 ASSESSMENT — MIFFLIN-ST. JEOR: SCORE: 1549.73

## 2020-11-24 NOTE — PROGRESS NOTES
"Pre-op physical.  The patient has brought a form to fill out for the [facility].  The patient was given a copy of the completed physical form and a copy was faxed.  Another copy was placed in the bin to be scanned into the chart.        Ale Lee MD, CAQ, FACSM, CCD  [facility]  Sports Medicine and Bone Health  Team Physician;  Athletics      /73 (BP Location: Right arm, Patient Position: Sitting, Cuff Size: Adult Regular)   Pulse 51   Ht 1.797 m (5' 10.75\")   Wt 72.8 kg (160 lb 6.4 oz)   SpO2 99%   BMI 22.53 kg/m      "

## 2020-11-24 NOTE — LETTER
Date:December 1, 2020      Patient was self referred, no letter generated. Do not send.        [facility] Physicians Health Information

## 2020-11-24 NOTE — LETTER
"  11/24/2020      RE: Kaela Stevens  [address] [facility] [address]       Pre-op physical.  The patient has brought a form to fill out for the [facility].  The patient was given a copy of the completed physical form and a copy was faxed.  Another copy was placed in the bin to be scanned into the chart.        Ale Lee MD, CAQ, FACSM, CCD  [facility]  Sports Medicine and Bone Health  Team Physician;  Athletics      /73 (BP Location: Right arm, Patient Position: Sitting, Cuff Size: Adult Regular)   Pulse 51   Ht 1.797 m (5' 10.75\")   Wt 72.8 kg (160 lb 6.4 oz)   SpO2 99%   BMI 22.53 kg/m          Ale Lee MD    "

## 2020-12-14 ENCOUNTER — TRANSFERRED RECORDS (OUTPATIENT)
Dept: HEALTH INFORMATION MANAGEMENT | Facility: CLINIC | Age: 30
End: 2020-12-14

## 2021-01-04 ENCOUNTER — HEALTH MAINTENANCE LETTER (OUTPATIENT)
Age: 31
End: 2021-01-04

## 2021-07-21 ENCOUNTER — TRANSFERRED RECORDS (OUTPATIENT)
Dept: HEALTH INFORMATION MANAGEMENT | Facility: CLINIC | Age: 31
End: 2021-07-21

## 2021-10-10 ENCOUNTER — HEALTH MAINTENANCE LETTER (OUTPATIENT)
Age: 31
End: 2021-10-10

## 2022-01-30 ENCOUNTER — HEALTH MAINTENANCE LETTER (OUTPATIENT)
Age: 32
End: 2022-01-30

## 2022-07-25 ENCOUNTER — TRANSFERRED RECORDS (OUTPATIENT)
Dept: HEALTH INFORMATION MANAGEMENT | Facility: CLINIC | Age: 32
End: 2022-07-25

## 2022-09-07 ENCOUNTER — TRANSFERRED RECORDS (OUTPATIENT)
Dept: HEALTH INFORMATION MANAGEMENT | Facility: CLINIC | Age: 32
End: 2022-09-07

## 2022-09-07 ENCOUNTER — OFFICE VISIT (OUTPATIENT)
Dept: ORTHOPEDICS | Facility: CLINIC | Age: 32
End: 2022-09-07
Payer: OTHER MISCELLANEOUS

## 2022-09-07 VITALS
SYSTOLIC BLOOD PRESSURE: 113 MMHG | DIASTOLIC BLOOD PRESSURE: 70 MMHG | BODY MASS INDEX: 24.24 KG/M2 | WEIGHT: 163.7 LBS | HEART RATE: 54 BPM | HEIGHT: 69 IN

## 2022-09-07 DIAGNOSIS — Z01.818 PRE-OP EXAMINATION: Primary | ICD-10-CM

## 2022-09-07 PROCEDURE — 99212 OFFICE O/P EST SF 10 MIN: CPT | Mod: GC | Performed by: FAMILY MEDICINE

## 2022-09-07 NOTE — LETTER
Date:September 9, 2022      Patient was self referred, no letter generated. Do not send.        [facility] Health Information

## 2022-09-07 NOTE — LETTER
9/7/2022      RE: Kaela Stevens  [address] [facility] [address]     Dear Colleague,    Thank you for referring your patient, Kaela Stevens, to the [facility] SPORTS MEDICINE CLINIC [place]. Please see a copy of my visit note below.     Subjective:   Kaela Stevens is a 31 year old female who presents to clinic for a pre-operative physical for her upcoming ACL surgery with Dr. Alton Wset on 9/13/22.           PREOPERATIVE EVALUATION:  Today's date: 9/7/22     Kaela Stevens is a 31 year old female who presents for a preoperative evaluation.     Please refer to scanned document in epic with complete physical documentation.    Surgical Information:  Left ACL and ALL repair.  Fax number for surgical facility: Note does not need to be faxed, will be available electronically in [facility] & specific surgical preop evaluation was provided to the patient to bring in for day of surgery.  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 Found to have MCL and ACL tear. MCL has healed. Plan for Dr. Alton Dsouza to perform ACL and ALL repair. Previous Right ACL and ALL repair with Dr. Dsouza.     Patient does not have a Health Care Directive or Living Will: Discussed advance care planning with patient; however, patient declined at this time.     RX monitoring program ([place]) reviewed:  reviewed- no concerns     No Chronic issues     Review of Systems  Constitutional, neuro, ENT, endocrine, pulmonary, cardiac, gastrointestinal, genitourinary, musculoskeletal, integument and psychiatric systems are negative, except as otherwise noted.     There are no active problems to display for this patient.     Past Medical History        Past Medical History:   Diagnosis Date      Left hand fracture       Metatarsal stress fracture of right foot       Oligomenorrhea                     Allergies   Allergen Reactions     Fluconazole Rash         Social History           Tobacco Use     Smoking status: Never Smoker     Smokeless tobacco: Never Used   Substance Use Topics     Alcohol use: Not on file      Family History         Family History   Problem Relation Age of Onset     Diabetes Type 2  Father               History   Drug Use Not on file                  Objective     Physical Exam  GENERAL APPEARANCE: healthy, alert and no distress  HENT: ear canals and TM's normal and nose and mouth without ulcers or lesions  RESP: lungs clear to auscultation - no rales, rhonchi or wheezes  CV: regular rate and rhythm, normal S1 S2, no S3 or S4 and no murmur, click or rub   ABDOMEN: soft, nontender, no HSM or masses and bowel sounds normal  MS: Positive anterior drawer test on the left compared to right.  No pain or laxity with valgus or varus.  5 out of 5 strength flexion and extension.  NEURO: Normal strength and tone, sensory exam grossly normal, mentation intact and speech normal      PRE-OP Diagnostics:  No labs were ordered during this visit.  No EKG required for low risk surgery (cataract, skin procedure, breast biopsy, etc).  6}           Assessment & Plan        REVISED CARDIAC RISK INDEX  The patient has the following serious cardiovascular risks for perioperative complications:  No serious cardiac risks = 0 points     INTERPRETATION: 0 points: Class I (very low risk - 0.4% complication rate)     The patient has the following additional risks and recommendations for perioperative complications:      - No identified additional risk factors other than previously addressed     MEDICATION INSTRUCTIONS:  Patient is on no chronic medications     RECOMMENDATION:  APPROVAL GIVEN to proceed with proposed procedure, without further diagnostic evaluation.     No follow-ups on file.     Signed  Electronically by: Randall Iglesias D.O.      Attending Note:   I have personally examined this patient and have reviewed the clinical presentation and progress note with the fellow. I agree with the treatment plan as outlined. The plan was formulated with the fellow on the day of the patient's visit. Please scan Epic pre-op form completed and scanned in.  The patient was given a copy of the completed form as well.       Ale Lee MD, CAQ, CCD  [facility]  Sports Medicine and Bone Health      Again, thank you for allowing me to participate in the care of your patient.      Sincerely,    Ale Lee MD

## 2022-09-07 NOTE — PROGRESS NOTES
Subjective:   Kaela Stevens is a 31 year old female who presents to clinic for a pre-operative physical for her upcoming ACL surgery with Dr. Alton West on 9/13/22.

## 2022-09-07 NOTE — PROGRESS NOTES
PREOPERATIVE EVALUATION:  Today's date: 9/7/22     Kaela Stevens is a 31 year old female who presents for a preoperative evaluation.     Please refer to scanned document in epic with complete physical documentation.    Surgical Information:  Left ACL and ALL repair.  Fax number for surgical facility: Note does not need to be faxed, will be available electronically in [facility] & specific surgical preop evaluation was provided to the patient to bring in for day of surgery.  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 Found to have MCL and ACL tear. MCL has healed. Plan for Dr. Alton Dsouza to perform ACL and ALL repair. Previous Right ACL and ALL repair with Dr. Dsouza.     Patient does not have a Health Care Directive or Living Will: Discussed advance care planning with patient; however, patient declined at this time.     RX monitoring program ([place]) reviewed:  reviewed- no concerns     No Chronic issues     Review of Systems  Constitutional, neuro, ENT, endocrine, pulmonary, cardiac, gastrointestinal, genitourinary, musculoskeletal, integument and psychiatric systems are negative, except as otherwise noted.     There are no active problems to display for this patient.     Past Medical History        Past Medical History:   Diagnosis Date     Left hand fracture       Metatarsal stress fracture of right foot       Oligomenorrhea                     Allergies   Allergen Reactions     Fluconazole Rash         Social History           Tobacco Use     Smoking status: Never Smoker     Smokeless tobacco: Never Used   Substance Use Topics     Alcohol use: Not on file      Family History         Family History   Problem Relation Age of Onset     Diabetes Type 2  Father                History   Drug Use Not on file                  Objective     Physical Exam  GENERAL APPEARANCE: healthy, alert and no distress  HENT: ear canals and TM's normal and nose and mouth without ulcers or lesions  RESP: lungs clear to auscultation - no rales, rhonchi or wheezes  CV: regular rate and rhythm, normal S1 S2, no S3 or S4 and no murmur, click or rub   ABDOMEN: soft, nontender, no HSM or masses and bowel sounds normal  MS: Positive anterior drawer test on the left compared to right.  No pain or laxity with valgus or varus.  5 out of 5 strength flexion and extension.  NEURO: Normal strength and tone, sensory exam grossly normal, mentation intact and speech normal      PRE-OP Diagnostics:  No labs were ordered during this visit.  No EKG required for low risk surgery (cataract, skin procedure, breast biopsy, etc).  6}           Assessment & Plan        REVISED CARDIAC RISK INDEX  The patient has the following serious cardiovascular risks for perioperative complications:  No serious cardiac risks = 0 points     INTERPRETATION: 0 points: Class I (very low risk - 0.4% complication rate)     The patient has the following additional risks and recommendations for perioperative complications:      - No identified additional risk factors other than previously addressed     MEDICATION INSTRUCTIONS:  Patient is on no chronic medications     RECOMMENDATION:  APPROVAL GIVEN to proceed with proposed procedure, without further diagnostic evaluation.     No follow-ups on file.     Signed Electronically by: Randall Iglesias D.O.

## 2022-09-08 NOTE — PROGRESS NOTES
Attending Note:   I have personally examined this patient and have reviewed the clinical presentation and progress note with the fellow. I agree with the treatment plan as outlined. The plan was formulated with the fellow on the day of the patient's visit. Please scan Epic pre-op form completed and scanned in.  The patient was given a copy of the completed form as well.       Ale Lee MD, CAQ, CCD  [facility]  Sports Medicine and Bone Health

## 2022-09-24 ENCOUNTER — HEALTH MAINTENANCE LETTER (OUTPATIENT)
Age: 32
End: 2022-09-24

## 2022-09-26 ENCOUNTER — TRANSFERRED RECORDS (OUTPATIENT)
Dept: HEALTH INFORMATION MANAGEMENT | Facility: CLINIC | Age: 32
End: 2022-09-26

## 2022-10-11 NOTE — LETTER
5/14/2020       RE: Kaela Stevens  [address] [facility] [address]     Dear Colleague,    Thank you for referring your patient, Kaela Stevens, to the [facility] SPORTS AND ORTHOPAEDIC WALK IN CLINIC at [facility]. Please see a copy of my visit note below.          SPORTS & ORTHOPEDIC WALK-IN VISIT 5/14/2020    Primary Care Physician: None    Reason for visit:     What part of your body is injured / painful?  left hip.     What caused the injury /pain? Unsure    How long ago did your injury occur or pain begin? several months ago    What are your most bothersome symptoms? Pain    How would you characterize your symptom?  aching and throbing    What makes your symptoms better? Rest and Ice    What makes your symptoms worse? Playing sport, lifting leg    Have you been previously seen for this problem? 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 Team Doc for the [employer] in [place] who presents for R hip pain.  It started at the end of January she was playing basketball and started to notice left hip pain after a game. The pain increases with activity. The majority of her pain is on the anterior hip and hip extension increase pain. OVerall her hip is feeling better but not 100% and she is concerned she would not be able to complete the upcoming season.  Training some but limited amounts of basketball due to COVID 19.  [place] [employer] [place] the [employer] in [place].  Here in [place] staying with her boyfriend.     No catching or locking.  No previous hip pain and no L sided hip pain.      One prior BSI, 5th MT several years ago healed well.       Medical History:    Any recent changes to your medical history? No    Any new medication prescribed since last visit? No    Have you had surgery on this body part before? No    Social History:    Occupation: Basketball    Handedness: Right    Exercise: Basketball    Review of Systems:    Do you  Patient here today for scheduled CBC and Retacrit injection    Patient presents for Retacrit injection today.     WBC (K/mcL)   Date Value   10/11/2022 4.7     RBC (mil/mcL)   Date Value   10/11/2022 2.68 (L)     HCT (%)   Date Value   10/11/2022 29.0 (L)     HGB (g/dL)   Date Value   10/11/2022 9.0 (L)     PLT (K/mcL)   Date Value   10/11/2022 90 (L)       I am an RN. Does the patient have an active anti-cancer treatment plan? (oral, injection, or IV) No.    Pre-Injection Information: Allergies reviewed as required for injection type., Pt states feeling well, no complaints. and Pt denies signs and symptoms of infection.    Refer to MAR (medication administration record) for type of injection and medication given.  Needle Size: 27 g. 1/2\" (40,000 units Retacrit given sq in right arm)  Patient tolerated well: Stable    Dr. Duran is supervising clinician today.    Instructed patient to call office with any concerns.    Next appointment scheduled:    Future Appointments   Date Time Provider Department Center   10/18/2022 11:00 AM LSS VL LAB LSSON1 LSS   10/18/2022 11:30 AM Griffin Duran MD LSSON1 LSS   10/25/2022 12:45 PM LSS VL LAB LSSON1 LSS   10/25/2022  1:00 PM LSS VL TREATMENT CHAIR LSSON1 LSS   11/1/2022 12:45 PM LSS VL LAB LSSON1 LSS   11/1/2022  1:00 PM LSS VL TREATMENT CHAIR LSSON1 LSS   11/8/2022  1:20 PM Michael D D'Amico, MD LSFFP LSF   12/6/2022 10:00 AM Sixto Monique MD STLAMCARD1 STLAM   3/23/2023 11:00 AM Chrystal Odom MD [facility] [address] [address]     Discharged home with family member.       "have fever, chills, weight loss? No    Do you have any vision problems? No    Do you have any chest pain or edema? No    Do you have any shortness of breath or wheezing?  No    Do you have stomach problems? No    Do you have any numbness or focal weakness? No    Do you have diabetes? No    Do you have problems with bleeding or clotting? No    Do you have an rashes or other skin lesions? No       O:Ht 1.753 m (5' 9\")   Wt 72.6 kg (160 lb)   BMI 23.63 kg/m    Alert, NAD  NC/AT  Sclerae anicteric  Regular  Resp nonlabored  Skin warm and dry  No focal neuro deficits. Speech intact. Normal gait.  Appropriate affect    TTP over AIIS. NTTP ASIS, anterior hip joint, GT, ITB, SI.   Pain with flexion >100 deg and IR >0.  Pain with resisted hip flexion with knee bent.    +FADIR. -ARACELY, -thigh thrust. -log roll.   No pain with single or double leg hop test x 5.       IMAGING:   Xr Pelvis W Hip Lt G/e 2 Vw    Result Date: 5/14/2020  2 views pelvis radiograph(s) 5/14/2020 5:51 PM History: AP pelvis, modified law lateral; Left hip pain Comparison: None available Findings: AP pelvis and frog-leg lateral view of the left hip were obtained. No acute osseous abnormality. No substantial degenerative change of hips. Sacrum and innominate bones are partially obscured by overlying bowel gas/fecal content. Mildly reduced femoral head neck offset bilaterally.     Impression: 1. No acute osseous abnormality. 2. No substantial degenerative change. 3. Mildly reduced femoral head neck offset of the proximal femur. JUTTA ELLERMANN, MD       A/P:   1. Chronic Left hip pain, suspect LUCY and possible labral tear   Xrays reviewed with patient and with evidence of LUCY, consistent with exam. Stress fracture is less likely but important to rule out, and discussed with patient an MRI with arthrogram would be best next step to evaluate. Patient is in agreement and we will get this set up. Follow up after MRI.   - XR Pelvis w Hip LT G/E 2 vw  - MR Hip " Left w Contrast; Future     Seen and discussed with Dr. Lee.     Peng Milian MD  Primary Care Sports Medicine Fellow     Attending Note:   I have personally examined this patient and have reviewed the clinical presentation and progress note with the fellow. I agree with the treatment plan as outlined. The plan was formulated with the fellow on the day of the patient's visit. I have reviewed all imaging with the fellow and agree with the findings in the documentation.  Kaela agrees to have me discuss her appt and imaging with Dr. 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     Ale Lee MD, CAQ, CCD  [facility]  Sports Medicine and Bone Health    Again, thank you for allowing me to participate in the care of your patient.      Sincerely,    Ale Lee MD

## 2023-02-13 ENCOUNTER — TRANSFERRED RECORDS (OUTPATIENT)
Dept: HEALTH INFORMATION MANAGEMENT | Facility: CLINIC | Age: 33
End: 2023-02-13
Payer: COMMERCIAL

## 2023-03-30 ENCOUNTER — TRANSFERRED RECORDS (OUTPATIENT)
Dept: HEALTH INFORMATION MANAGEMENT | Facility: CLINIC | Age: 33
End: 2023-03-30
Payer: COMMERCIAL

## 2023-04-05 ENCOUNTER — DOCUMENTATION ONLY (OUTPATIENT)
Dept: ORTHOPEDICS | Facility: CLINIC | Age: 33
End: 2023-04-05
Payer: COMMERCIAL

## 2023-04-05 NOTE — PROGRESS NOTES
4.5.23 9:25 AM  -- received records from [facility], service date 3.30.23. sent to scan.

## 2023-05-08 ENCOUNTER — HEALTH MAINTENANCE LETTER (OUTPATIENT)
Age: 33
End: 2023-05-08

## 2023-11-06 ENCOUNTER — TRANSFERRED RECORDS (OUTPATIENT)
Dept: HEALTH INFORMATION MANAGEMENT | Facility: CLINIC | Age: 33
End: 2023-11-06
Payer: COMMERCIAL

## 2024-07-14 ENCOUNTER — HEALTH MAINTENANCE LETTER (OUTPATIENT)
Age: 34
End: 2024-07-14

## 2024-08-23 ENCOUNTER — OFFICE VISIT (OUTPATIENT)
Dept: ORTHOPEDICS | Facility: CLINIC | Age: 34
End: 2024-08-23

## 2024-08-23 ENCOUNTER — DOCUMENTATION ONLY (OUTPATIENT)
Dept: ORTHOPEDICS | Facility: CLINIC | Age: 34
End: 2024-08-23

## 2024-08-23 ENCOUNTER — TRANSFERRED RECORDS (OUTPATIENT)
Dept: HEALTH INFORMATION MANAGEMENT | Facility: CLINIC | Age: 34
End: 2024-08-23

## 2024-08-23 VITALS
DIASTOLIC BLOOD PRESSURE: 74 MMHG | OXYGEN SATURATION: 100 % | HEIGHT: 70 IN | BODY MASS INDEX: 24.2 KG/M2 | SYSTOLIC BLOOD PRESSURE: 113 MMHG | WEIGHT: 169 LBS | HEART RATE: 47 BPM

## 2024-08-23 DIAGNOSIS — Z01.818 PRE-OP EXAMINATION: Primary | ICD-10-CM

## 2024-08-23 PROCEDURE — 99214 OFFICE O/P EST MOD 30 MIN: CPT | Mod: GC | Performed by: FAMILY MEDICINE

## 2024-08-23 NOTE — LETTER
8/23/2024      RE: Kaela Stevens  [address] [facility] [address]     Dear Colleague,    Thank you for referring your patient, Kaela Stevens, to the [facility] SPORTS MEDICINE CLINIC [place]. Please see a copy of my visit note below.    NAME: Kaela Stevens      AGE: 33 year old      SEX: female  Chief Complaint/Reason for Procedure: knee scope, R    Indications:  Surgeon:  Dr. Dsouza  Date of Surgery: 08/27/2024  Location: [facility]    HPI: prior ACL reconstruction on the R 2020, now needing knee scope. She has some chronic pain in this knee, and now has time to perform the surgery.     PAST HISTORY  No past surgical history on file.    Past Medical History:   Diagnosis Date     Left hand fracture      Metatarsal stress fracture of right foot      Oligomenorrhea        MEDICATIONS:  No current outpatient medications on file.       ALLERGIES  Fluconazole    SOCIAL HISTORY  Social History     Tobacco Use     Smoking status: Never     Smokeless tobacco: Never       FAMILY HISTORY  Family History   Problem Relation Age of Onset     Diabetes Type 2  Father        Family History of Anesthesia Reaction: No  Family History of Bleeding Disorder:  No    REVIEW OF SYSTEMS  CONSTITUTIONAL: NEGATIVE for fever, chills, change in weight  INTEGUMENTARY/SKIN: NEGATIVE for worrisome rashes, moles or lesions  EYES: NEGATIVE for vision changes or irritation  ENT/MOUTH: NEGATIVE for ear, mouth and throat problems  RESP: NEGATIVE for significant cough or SOB  BREAST: NEGATIVE for masses, tenderness or discharge  CV: NEGATIVE for chest pain, palpitations or peripheral edema  GI: NEGATIVE for nausea, abdominal pain, heartburn, or change in bowel habits  : NEGATIVE for frequency, dysuria, or hematuria  MUSCULOSKELETAL: NEGATIVE for significant arthralgias or myalgia  NEURO: NEGATIVE for weakness, dizziness or paresthesias  ENDOCRINE: NEGATIVE for temperature intolerance, skin/hair changes  HEME:  "NEGATIVE for bleeding problems  PSYCHIATRIC: NEGATIVE for changes in mood or affect    PHYSICAL EXAM  /74   Pulse (!) 47   Ht 1.765 m (5' 9.5\")   Wt 76.7 kg (169 lb)   SpO2 100%   BMI 24.60 kg/m    General Appearance: Normal  Skin:  Normal  Head:  Normal  Eyes: Normal  Ears: Normal  Nose: Normal  Mouth/Teeth: Normal  Throat: Normal  Neck: Normal  Chest/Lungs: Normal  Heart/Vascular: Normal  Abdomen: Normal  Skeletal: Normal  Lymphoid: Normal  Neuromuscular: Normal    LAB/RADIOLOGY RESULTS  Other: none  Chest X-ray: N/A  EKG (or photocopy): N/A    PLAN  This patient has been examined by me this day and has been found to be an acceptable candidate for surgery with apppropriate anesthesia.      Faisal Finn DO  Primary Care Spots Medicine Fellow   [facility]   08/23/2024        Attending Note:   I have  examined this patient and have reviewed the clinical presentation and progress note with the fellow. I agree with the treatment plan as outlined. The plan was formulated with the fellow on the day of the patient's visit.      lAe Lee MD, CAQ, CCD  [facility]  Sports Medicine and Bone Health      Again, thank you for allowing me to participate in the care of your patient.      Sincerely,    Ale Lee MD  "

## 2024-08-23 NOTE — PROGRESS NOTES
"NAME: Kaela Stevens      AGE: 33 year old      SEX: female  Chief Complaint/Reason for Procedure: knee scope, R    Indications:  Surgeon:  Dr. Dsouza  Date of Surgery: 08/27/2024  [facility]: [facility]    HPI: prior ACL reconstruction on the R 2020, now needing knee scope. She has some chronic pain in this knee, and now has time to perform the surgery.     PAST HISTORY  No past surgical history on file.    Past Medical History:   Diagnosis Date    Left hand fracture     Metatarsal stress fracture of right foot     Oligomenorrhea        MEDICATIONS:  No current outpatient medications on file.       ALLERGIES  Fluconazole    SOCIAL HISTORY  Social History     Tobacco Use    Smoking status: Never    Smokeless tobacco: Never       FAMILY HISTORY  Family History   Problem Relation Age of Onset    Diabetes Type 2  Father        Family History of Anesthesia Reaction: No  Family History of Bleeding Disorder:  No    REVIEW OF SYSTEMS  CONSTITUTIONAL: NEGATIVE for fever, chills, change in weight  INTEGUMENTARY/SKIN: NEGATIVE for worrisome rashes, moles or lesions  EYES: NEGATIVE for vision changes or irritation  ENT/MOUTH: NEGATIVE for ear, mouth and throat problems  RESP: NEGATIVE for significant cough or SOB  BREAST: NEGATIVE for masses, tenderness or discharge  CV: NEGATIVE for chest pain, palpitations or peripheral edema  GI: NEGATIVE for nausea, abdominal pain, heartburn, or change in bowel habits  : NEGATIVE for frequency, dysuria, or hematuria  MUSCULOSKELETAL: NEGATIVE for significant arthralgias or myalgia  NEURO: NEGATIVE for weakness, dizziness or paresthesias  ENDOCRINE: NEGATIVE for temperature intolerance, skin/hair changes  HEME: NEGATIVE for bleeding problems  PSYCHIATRIC: NEGATIVE for changes in mood or affect    PHYSICAL EXAM  /74   Pulse (!) 47   Ht 1.765 m (5' 9.5\")   Wt 76.7 kg (169 lb)   SpO2 100%   BMI 24.60 kg/m    General Appearance: Normal  Skin:  Normal  Head:  " Normal  Eyes: Normal  Ears: Normal  Nose: Normal  Mouth/Teeth: Normal  Throat: Normal  Neck: Normal  Chest/Lungs: Normal  Heart/Vascular: Normal  Abdomen: Normal  Skeletal: Normal  Lymphoid: Normal  Neuromuscular: Normal    LAB/RADIOLOGY RESULTS  Other: none  Chest X-ray: N/A  EKG (or photocopy): N/A    PLAN  This patient has been examined by me this day and has been found to be an acceptable candidate for surgery with apppropriate anesthesia.      Faisal Finn DO  Primary Care Spots Medicine Fellow   [facility]   08/23/2024

## 2024-08-23 NOTE — PROGRESS NOTES
Attending Note:   I have  examined this patient and have reviewed the clinical presentation and progress note with the fellow. I agree with the treatment plan as outlined. The plan was formulated with the fellow on the day of the patient's visit.      Ale Lee MD, CAQ, CCD  [facility]  Sports Medicine and Bone Health

## 2024-08-26 ENCOUNTER — TRANSFERRED RECORDS (OUTPATIENT)
Dept: HEALTH INFORMATION MANAGEMENT | Facility: CLINIC | Age: 34
End: 2024-08-26

## 2024-08-27 ENCOUNTER — MEDICAL CORRESPONDENCE (OUTPATIENT)
Dept: HEALTH INFORMATION MANAGEMENT | Facility: CLINIC | Age: 34
End: 2024-08-27

## 2024-09-09 ENCOUNTER — TRANSFERRED RECORDS (OUTPATIENT)
Dept: HEALTH INFORMATION MANAGEMENT | Facility: CLINIC | Age: 34
End: 2024-09-09

## 2025-07-19 ENCOUNTER — HEALTH MAINTENANCE LETTER (OUTPATIENT)
Age: 35
End: 2025-07-19